# Patient Record
Sex: MALE | Race: WHITE | NOT HISPANIC OR LATINO | URBAN - METROPOLITAN AREA
[De-identification: names, ages, dates, MRNs, and addresses within clinical notes are randomized per-mention and may not be internally consistent; named-entity substitution may affect disease eponyms.]

---

## 2025-03-27 ENCOUNTER — INPATIENT (INPATIENT)
Facility: HOSPITAL | Age: 60
LOS: 0 days | Discharge: HOME HEALTH | DRG: 603 | End: 2025-03-28
Attending: SURGERY | Admitting: SURGERY
Payer: COMMERCIAL

## 2025-03-27 VITALS
HEIGHT: 73 IN | SYSTOLIC BLOOD PRESSURE: 105 MMHG | OXYGEN SATURATION: 100 % | DIASTOLIC BLOOD PRESSURE: 72 MMHG | RESPIRATION RATE: 17 BRPM | WEIGHT: 149.91 LBS | TEMPERATURE: 98 F | HEART RATE: 65 BPM

## 2025-03-27 DIAGNOSIS — L02.31 CUTANEOUS ABSCESS OF BUTTOCK: ICD-10-CM

## 2025-03-27 LAB
ALBUMIN SERPL ELPH-MCNC: 3.9 G/DL — SIGNIFICANT CHANGE UP (ref 3.5–5.2)
ALP SERPL-CCNC: 77 U/L — SIGNIFICANT CHANGE UP (ref 30–115)
ALT FLD-CCNC: 12 U/L — SIGNIFICANT CHANGE UP (ref 0–41)
ANION GAP SERPL CALC-SCNC: 12 MMOL/L — SIGNIFICANT CHANGE UP (ref 7–14)
AST SERPL-CCNC: 20 U/L — SIGNIFICANT CHANGE UP (ref 0–41)
BASOPHILS # BLD AUTO: 0.04 K/UL — SIGNIFICANT CHANGE UP (ref 0–0.2)
BASOPHILS NFR BLD AUTO: 0.3 % — SIGNIFICANT CHANGE UP (ref 0–1)
BILIRUB SERPL-MCNC: <0.2 MG/DL — SIGNIFICANT CHANGE UP (ref 0.2–1.2)
BUN SERPL-MCNC: 20 MG/DL — SIGNIFICANT CHANGE UP (ref 10–20)
CALCIUM SERPL-MCNC: 9.4 MG/DL — SIGNIFICANT CHANGE UP (ref 8.4–10.5)
CHLORIDE SERPL-SCNC: 107 MMOL/L — SIGNIFICANT CHANGE UP (ref 98–110)
CO2 SERPL-SCNC: 22 MMOL/L — SIGNIFICANT CHANGE UP (ref 17–32)
CREAT SERPL-MCNC: 0.7 MG/DL — SIGNIFICANT CHANGE UP (ref 0.7–1.5)
CRP SERPL-MCNC: 12.4 MG/L — HIGH
EGFR: 105 ML/MIN/1.73M2 — SIGNIFICANT CHANGE UP
EGFR: 105 ML/MIN/1.73M2 — SIGNIFICANT CHANGE UP
EOSINOPHIL # BLD AUTO: 0.04 K/UL — SIGNIFICANT CHANGE UP (ref 0–0.7)
EOSINOPHIL NFR BLD AUTO: 0.3 % — SIGNIFICANT CHANGE UP (ref 0–8)
ERYTHROCYTE [SEDIMENTATION RATE] IN BLOOD: 60 MM/HR — HIGH (ref 0–10)
GLUCOSE SERPL-MCNC: 112 MG/DL — HIGH (ref 70–99)
HCT VFR BLD CALC: 36.5 % — LOW (ref 42–52)
HGB BLD-MCNC: 12.3 G/DL — LOW (ref 14–18)
IMM GRANULOCYTES NFR BLD AUTO: 0.6 % — HIGH (ref 0.1–0.3)
LYMPHOCYTES # BLD AUTO: 1.25 K/UL — SIGNIFICANT CHANGE UP (ref 1.2–3.4)
LYMPHOCYTES # BLD AUTO: 7.9 % — LOW (ref 20.5–51.1)
MCHC RBC-ENTMCNC: 31.8 PG — HIGH (ref 27–31)
MCHC RBC-ENTMCNC: 33.7 G/DL — SIGNIFICANT CHANGE UP (ref 32–37)
MCV RBC AUTO: 94.3 FL — HIGH (ref 80–94)
MONOCYTES # BLD AUTO: 0.58 K/UL — SIGNIFICANT CHANGE UP (ref 0.1–0.6)
MONOCYTES NFR BLD AUTO: 3.7 % — SIGNIFICANT CHANGE UP (ref 1.7–9.3)
NEUTROPHILS # BLD AUTO: 13.82 K/UL — HIGH (ref 1.4–6.5)
NEUTROPHILS NFR BLD AUTO: 87.2 % — HIGH (ref 42.2–75.2)
NRBC BLD AUTO-RTO: 0 /100 WBCS — SIGNIFICANT CHANGE UP (ref 0–0)
PLATELET # BLD AUTO: 256 K/UL — SIGNIFICANT CHANGE UP (ref 130–400)
PMV BLD: 9.7 FL — SIGNIFICANT CHANGE UP (ref 7.4–10.4)
POTASSIUM SERPL-MCNC: 4.9 MMOL/L — SIGNIFICANT CHANGE UP (ref 3.5–5)
POTASSIUM SERPL-SCNC: 4.9 MMOL/L — SIGNIFICANT CHANGE UP (ref 3.5–5)
PROT SERPL-MCNC: 6.6 G/DL — SIGNIFICANT CHANGE UP (ref 6–8)
RBC # BLD: 3.87 M/UL — LOW (ref 4.7–6.1)
RBC # FLD: 12.8 % — SIGNIFICANT CHANGE UP (ref 11.5–14.5)
SODIUM SERPL-SCNC: 141 MMOL/L — SIGNIFICANT CHANGE UP (ref 135–146)
WBC # BLD: 15.83 K/UL — HIGH (ref 4.8–10.8)
WBC # FLD AUTO: 15.83 K/UL — HIGH (ref 4.8–10.8)

## 2025-03-27 RX ORDER — ENOXAPARIN SODIUM 100 MG/ML
40 INJECTION SUBCUTANEOUS EVERY 24 HOURS
Refills: 0 | Status: DISCONTINUED | OUTPATIENT
Start: 2025-03-27 | End: 2025-03-28

## 2025-03-27 RX ORDER — VANCOMYCIN HCL IN 5 % DEXTROSE 1.5G/250ML
1000 PLASTIC BAG, INJECTION (ML) INTRAVENOUS EVERY 12 HOURS
Refills: 0 | Status: DISCONTINUED | OUTPATIENT
Start: 2025-03-28 | End: 2025-03-28

## 2025-03-27 RX ORDER — ACETAMINOPHEN 500 MG/5ML
650 LIQUID (ML) ORAL ONCE
Refills: 0 | Status: COMPLETED | OUTPATIENT
Start: 2025-03-27 | End: 2025-03-27

## 2025-03-27 RX ORDER — PIPERACILLIN-TAZO-DEXTROSE,ISO 3.375G/5
3.38 IV SOLUTION, PIGGYBACK PREMIX FROZEN(ML) INTRAVENOUS ONCE
Refills: 0 | Status: COMPLETED | OUTPATIENT
Start: 2025-03-27 | End: 2025-03-27

## 2025-03-27 RX ORDER — PIPERACILLIN-TAZO-DEXTROSE,ISO 3.375G/5
3.38 IV SOLUTION, PIGGYBACK PREMIX FROZEN(ML) INTRAVENOUS ONCE
Refills: 0 | Status: COMPLETED | OUTPATIENT
Start: 2025-03-28 | End: 2025-03-28

## 2025-03-27 RX ORDER — ACETAMINOPHEN 500 MG/5ML
650 LIQUID (ML) ORAL EVERY 6 HOURS
Refills: 0 | Status: DISCONTINUED | OUTPATIENT
Start: 2025-03-27 | End: 2025-03-28

## 2025-03-27 RX ORDER — PIPERACILLIN-TAZO-DEXTROSE,ISO 3.375G/5
3.38 IV SOLUTION, PIGGYBACK PREMIX FROZEN(ML) INTRAVENOUS EVERY 8 HOURS
Refills: 0 | Status: DISCONTINUED | OUTPATIENT
Start: 2025-03-28 | End: 2025-03-28

## 2025-03-27 RX ORDER — DIPHENHYDRAMINE HCL 12.5MG/5ML
25 ELIXIR ORAL ONCE
Refills: 0 | Status: COMPLETED | OUTPATIENT
Start: 2025-03-27 | End: 2025-03-27

## 2025-03-27 RX ORDER — KETOROLAC TROMETHAMINE 30 MG/ML
15 INJECTION, SOLUTION INTRAMUSCULAR; INTRAVENOUS EVERY 6 HOURS
Refills: 0 | Status: DISCONTINUED | OUTPATIENT
Start: 2025-03-27 | End: 2025-03-28

## 2025-03-27 RX ORDER — VANCOMYCIN HCL IN 5 % DEXTROSE 1.5G/250ML
1000 PLASTIC BAG, INJECTION (ML) INTRAVENOUS ONCE
Refills: 0 | Status: COMPLETED | OUTPATIENT
Start: 2025-03-27 | End: 2025-03-27

## 2025-03-27 RX ADMIN — Medication 25 MILLIGRAM(S): at 18:04

## 2025-03-27 RX ADMIN — Medication 250 MILLIGRAM(S): at 19:38

## 2025-03-27 RX ADMIN — Medication 200 GRAM(S): at 21:41

## 2025-03-27 NOTE — H&P ADULT - ATTENDING COMMENTS
59 year old male with recurrent infections of soft tissues and multiple abscesses in his gluteal region and right knee.   PMHx prior narcotic abuse, tobacco and marijuana use, prior homelessness, PSHx R shoulder surgery who presented to the ED with 1 week pain and erythema in his L and R gluteal region and R knee.   O/E:  Afebrile, HDS, leukocytosis to 15.8, evaluated by orthopedic surgery who determined no concern for septic arthritis or further orthopedic intervention.    PLAN:  - Admit to Surgery for IV abx  - S/p bedside I&D of abscesses (see procedure note for details)  - Zosyn and vancomycin  - Regular diet  - Pain management  - ID consult for multiple abscesses in absence of triggering factors      Time spent is 1 hour  [id number], 72

## 2025-03-27 NOTE — H&P ADULT - NSICDXPASTMEDICALHX_GEN_ALL_CORE_FT
PAST MEDICAL HISTORY:  Marijuana use     Narcotic abuse     Tobacco dependence with current use

## 2025-03-27 NOTE — H&P ADULT - NSHPPHYSICALEXAM_GEN_ALL_CORE
GENERAL: A&Ox3, NAD  HEENT: Normocephalic, atraumatic  PULM: Non-labored respirations, bilateral chest rise, saturating well on RA  CV: Regular rate and rhythm  ABDOMEN: Abdomen soft, non-distended, non-tender  RECTAL: L and R gluteal abscesses with small punctum with purulence, fluctuance, erythema, induration, tender to palpation, no external anal lesions, L>R in size  MSK: Moving extremities spontaneously, BUE and BLE sensorimotor and strength intact, R knee abscess with 0.5-1 cm scab, punctum with purulence, surrounding erythema, induration, and fluctuance, tender to palpation  SKIN: Warm, dry, normal skin color, texture, turgor

## 2025-03-27 NOTE — ED PROVIDER NOTE - PHYSICAL EXAMINATION
CONSTITUTIONAL: well developed, nontoxic appearing, in no acute distress, speaking in full sentences  SKIN: warm, dry. 2cm x1.5cm fluctuant abscess left lateral to anus on inside of L. buttox. 2cm x 1.5cm abscess on anterior aspect of r. knee, actively draining purulent secretions.   HEENT: normocephalic, no conjunctival erythema, moist mucous membranes, patent airway  NECK: supple  CV:  regular rate  RESP: normal work of breathing  ABD: nondistended  MSK: moves all extremities, no cyanosis, no edema  NEURO: alert, oriented, grossly unremarkable  : Without lesions.   PSYCH: cooperative, appropriate

## 2025-03-27 NOTE — ED ADULT TRIAGE NOTE - CHIEF COMPLAINT QUOTE
"I have abscess in my right knee. It's been like this for like a week. 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

## 2025-03-27 NOTE — CONSULT NOTE ADULT - SUBJECTIVE AND OBJECTIVE BOX
· HPI Objective Statement: 59M with no PMHx presents to the ED for multiple abscesses. Patient refers that he has never had abscesses before, that recently he moved into a new apartment and sat naked on the dirty floor, noticed a few days later that he had some redness, swelling, and irritation just lateral to his anus. Patient also noticed he had some pain to his left knee three days ago after bending down at work. Today he noticed some purulent drainage from the knee and went to  who sent him to the ED for IV anitbiotics. Otherwise denies fevers, headache, chills, cp, sob, n/v/d, abd pain, back pain, changes in urinary/stool habitus, calf tenderness or swelling, recent travel.  pt seen at bedside.    states he felt a puncture injury to the knee area.   is able to ambulate and weight bear fully         Allergies    No Known Allergies    Intolerances      PAST MEDICAL & SURGICAL HISTORY:    MEDICATIONS  (STANDING):  acetaminophen     Tablet .. 650 milliGRAM(s) Oral once    MEDICATIONS  (PRN):      Vital Signs Last 24 Hrs  T(C): 36.9 (27 Mar 2025 14:42), Max: 36.9 (27 Mar 2025 14:42)  T(F): 98.4 (27 Mar 2025 14:42), Max: 98.4 (27 Mar 2025 14:42)  HR: 65 (27 Mar 2025 14:42) (65 - 65)  BP: 105/72 (27 Mar 2025 14:42) (105/72 - 105/72)  BP(mean): --  RR: 17 (27 Mar 2025 14:42) (17 - 17)  SpO2: 100% (27 Mar 2025 14:42) (100% - 100%)    Parameters below as of 27 Mar 2025 14:42  Patient On (Oxygen Delivery Method): room air         right lower ext:   sitting in chair comfortable with knee bent to 90       no swelling of the knee,    pt has area of erythema and fluctuance over vmo area,  pt can flex and extend knee 0-110 no difficulties,   observed ambulation with full weight bearing around the room.                 Imaging: pending, labs pending

## 2025-03-27 NOTE — CONSULT NOTE ADULT - ASSESSMENT
right  knee fluctuance likely abscess extraarticular to knee joint     no concerns of septic arthritis   can follow up ordered labs and knee xray for completion   no orthopedic intervention   please consult burn service for numerous abscess about body   recall prn

## 2025-03-27 NOTE — CONSULT NOTE ADULT - SUBJECTIVE AND OBJECTIVE BOX
GENERAL SURGERY CONSULT NOTE    Patient: RODO KONG , 59y ([birth year])Male   MRN: [id number]  Location: [facility] ED  Visit: 03-27-25 Emergency  Date: 03-27-25 @ 17:44    HPI:  Pt is 59M no reported PMHx or PSHx who presented to the ED with 1 week pain and erythema in his L gluteal region and R knee. Per pt, symptoms began in L gluteal region 2 days after sitting undressed in his apartment on dirty floor and R knee symptoms began approximately 2 days after kneeling at work and feeling like he sustained a small puncture injury to the knee. Spent the last few days trying to keep his wounds clean with betadine, presented to the ER for persistent pain and erythema of his wounds. Has been able to bear weight on the knee and ambulate without difficulty or change from baseline, reports intermittent purulent drainage from the knee, pain has not worsened. Denies fevers, chills, CP, SOB, N/V/C/D, rectal pain, dyschezia, melena, hematochezia, blood or purulence when wiping, bleeding from knee, or any other abscesses. Denies prior episodes of abscesses. Upon arrival to the ED, was afebrile, HDS, leukocytosis to 15.8, evaluated by orthopedic surgery who determined no concern for septic arthritis or further orthopedic intervention.    PAST MEDICAL & SURGICAL HISTORY:  Denies    Home Medications:  Denies    VITALS:  T(F): 98.4 (03-27-25 @ 14:42), Max: 98.4 (03-27-25 @ 14:42)  HR: 65 (03-27-25 @ 14:42) (65 - 65)  BP: 105/72 (03-27-25 @ 14:42) (105/72 - 105/72)  RR: 17 (03-27-25 @ 14:42) (17 - 17)  SpO2: 100% (03-27-25 @ 14:42) (100% - 100%)    PHYSICAL EXAM:  GENERAL: A&Ox3, NAD  HEENT: Normocephalic, atraumatic  PULM: Non-labored respirations, bilateral chest rise, saturating well on RA  CV: Regular rate and rhythm  ABDOMEN: Abdomen soft, non-distended, non-tender  RECTAL: L gluteal abscess with small punctum with purulence, fluctuance, erythema, induration, tender to palpation, no external anal lesions  MSK: Moving extremities spontaneously, BUE and BLE sensorimotor and strength intact, R knee abscess with 0.5-1 cm scab, punctum with purulence, surrounding erythema, induration, and fluctuance, tender to palpation  SKIN: Warm, dry, normal skin color, texture, turgor    LAB/STUDIES:                        12.3   15.83 )-----------( 256      ( 27 Mar 2025 16:39 )             36.5     03-27    141  |  107  |  20  ----------------------------<  112[H]  4.9   |  22  |  0.7    Ca    9.4      27 Mar 2025 16:39    TPro  6.6  /  Alb  3.9  /  TBili  <0.2  /  DBili  x   /  AST  20  /  ALT  12  /  AlkPhos  77  03-27      LIVER FUNCTIONS - ( 27 Mar 2025 16:39 )  Alb: 3.9 g/dL / Pro: 6.6 g/dL / ALK PHOS: 77 U/L / ALT: 12 U/L / AST: 20 U/L / GGT: x           Urinalysis Basic - ( 27 Mar 2025 16:39 )    Color: x / Appearance: x / SG: x / pH: x  Gluc: 112 mg/dL / Ketone: x  / Bili: x / Urobili: x   Blood: x / Protein: x / Nitrite: x   Leuk Esterase: x / RBC: x / WBC x   Sq Epi: x / Non Sq Epi: x / Bacteria: x    ASSESSMENT:  59M no reported PMHx or PSHx who presented to the ED with 1 week pain and erythema in his L gluteal region and R knee. Examination c/w L gluteal abscess and R knee abscess.    PLAN:  - Final plan pending discussion with attending    To be discussed with attending.   GENERAL SURGERY CONSULT NOTE    Patient: RODO KONG , 59y ([birth year])Male   MRN: [id number]  Location: [facility] ED  Visit: 03-27-25 Emergency  Date: 03-27-25 @ 17:44    HPI:  Pt is 59M PMHx prior substance use, tobacco use, PSHx R shoulder surgery who presented to the ED with 1 week pain and erythema in his L gluteal region and R knee. Per pt, symptoms began in L gluteal region 2 days after sitting undressed in his apartment on dirty floor and R knee symptoms began approximately 2 days after kneeling at work and feeling like he sustained a small puncture injury to the knee. Spent the last few days trying to keep his wounds clean with betadine, presented to the ER for persistent pain and erythema of his wounds. Has been able to bear weight on the knee and ambulate without difficulty or change from baseline, reports intermittent purulent drainage from the knee, pain has not worsened. Denies fevers, chills, CP, SOB, N/V/C/D, rectal pain, dyschezia, melena, hematochezia, blood or purulence when wiping, bleeding from knee, or any other abscesses. Denies prior episodes of abscesses. Upon arrival to the ED, was afebrile, HDS, leukocytosis to 15.8, evaluated by orthopedic surgery who determined no concern for septic arthritis or further orthopedic intervention.    PAST MEDICAL & SURGICAL HISTORY:  Prior substance use  R shoulder surgery    Home Medications:  Denies    VITALS:  T(F): 98.4 (03-27-25 @ 14:42), Max: 98.4 (03-27-25 @ 14:42)  HR: 65 (03-27-25 @ 14:42) (65 - 65)  BP: 105/72 (03-27-25 @ 14:42) (105/72 - 105/72)  RR: 17 (03-27-25 @ 14:42) (17 - 17)  SpO2: 100% (03-27-25 @ 14:42) (100% - 100%)    PHYSICAL EXAM:  GENERAL: A&Ox3, NAD  HEENT: Normocephalic, atraumatic  PULM: Non-labored respirations, bilateral chest rise, saturating well on RA  CV: Regular rate and rhythm  ABDOMEN: Abdomen soft, non-distended, non-tender  RECTAL: L gluteal abscess with small punctum with purulence, fluctuance, erythema, induration, tender to palpation, no external anal lesions  MSK: Moving extremities spontaneously, BUE and BLE sensorimotor and strength intact, R knee abscess with 0.5-1 cm scab, punctum with purulence, surrounding erythema, induration, and fluctuance, tender to palpation  SKIN: Warm, dry, normal skin color, texture, turgor    LAB/STUDIES:                        12.3   15.83 )-----------( 256      ( 27 Mar 2025 16:39 )             36.5     03-27    141  |  107  |  20  ----------------------------<  112[H]  4.9   |  22  |  0.7    Ca    9.4      27 Mar 2025 16:39    TPro  6.6  /  Alb  3.9  /  TBili  <0.2  /  DBili  x   /  AST  20  /  ALT  12  /  AlkPhos  77  03-27      LIVER FUNCTIONS - ( 27 Mar 2025 16:39 )  Alb: 3.9 g/dL / Pro: 6.6 g/dL / ALK PHOS: 77 U/L / ALT: 12 U/L / AST: 20 U/L / GGT: x           Urinalysis Basic - ( 27 Mar 2025 16:39 )    Color: x / Appearance: x / SG: x / pH: x  Gluc: 112 mg/dL / Ketone: x  / Bili: x / Urobili: x   Blood: x / Protein: x / Nitrite: x   Leuk Esterase: x / RBC: x / WBC x   Sq Epi: x / Non Sq Epi: x / Bacteria: x    ASSESSMENT:  59M no reported PMHx or PSHx who presented to the ED with 1 week pain and erythema in his L gluteal region and R knee. Examination c/w L gluteal abscess and R knee abscess.    PLAN:  - Final plan pending discussion with attending    To be discussed with attending.

## 2025-03-27 NOTE — H&P ADULT - ASSESSMENT
ASSESSMENT:  59M PMHx prior narcotic abuse, tobacco and marijuana use, prior homelessness, PSHx R shoulder surgery who presented to the ED with 1 week pain and erythema in his L and R gluteal region and R knee. Afebrile, HDS, leukocytosis to 15.8, evaluated by orthopedic surgery who determined no concern for septic arthritis or further orthopedic intervention.    PLAN:  - Admit to 4C under Dr. Lucas for IV abx  - S/p bedside I&D of abscesses (see procedure note for details)  - Zosyn and vancomycin  - Regular diet  - Pain management  - ID consult for multiple abscesses in absence of triggering factors    Discussed with attending.

## 2025-03-27 NOTE — H&P ADULT - HISTORY OF PRESENT ILLNESS
Pt is 59M PMHx prior narcotic abuse, tobacco and marijuana use, prior homelessness, PSHx R shoulder surgery who presented to the ED with 1 week pain and erythema in his L and R gluteal region and R knee. Per pt, symptoms began in L and R gluteal region 2 days after sitting undressed in his apartment on dirty floor and R knee symptoms began approximately 2 days after kneeling at work and feeling like he sustained a small puncture injury to the knee. Spent the last few days trying to keep his wounds clean with betadine, presented to the ER for persistent pain and erythema of his wounds. Has been able to bear weight on the knee and ambulate without difficulty or change from baseline, reports intermittent purulent drainage from the knee, pain has not worsened. Denies fevers, chills, CP, SOB, N/V/C/D, rectal pain, dyschezia, melena, hematochezia, blood or purulence when wiping, bleeding from knee, or any other abscesses. Denies prior episodes of abscesses. Upon arrival to the ED, was afebrile, HDS, leukocytosis to 15.8, evaluated by orthopedic surgery who determined no concern for septic arthritis or further orthopedic intervention. During evaluation, pt reported being prescribed various PO antibiotics for dental pain by Urgent Care in recent months, was also prescribed prednisone 2mg BID in 2/19 for unspecified reason, has been taking sporadically for pain, reports last took a dose this AM (last dose was weeks ago).

## 2025-03-27 NOTE — ED PROVIDER NOTE - PROGRESS NOTE DETAILS
DOMINIC WESLEY:  Patient evaluated as per HPI and PE.  Patient with multiple abscess. Called ortho for R. Knee abscess.   Plan: Labs, US, Ortho Recs, I&D Pilonidal, Revaluate DOMINIC WESLEY:  Ortho contacted given concern for septic joint. Refer is not septic joint.   Recommened to contact burn/surg.  Surgery contacted for abscess i&d. F/U Recommendations DOMINIC WESLEY:  Surgery performing I&D, recommended admission to Dr. Lucas 4C and initiation of IV Abx

## 2025-03-27 NOTE — PROCEDURE NOTE - ADDITIONAL PROCEDURE DETAILS
I&D of L gluteal abscess, R gluteal abscess, R knee abscess. 1% lidocaine injected for analgesia. #11 blade used to make 1.5 cm incision on R gluteal abscess, 3.5 cm incision on L gluteal abscess, 3 cm incision on R knee abscess. R gluteal abscess yielded bloody fluid, while L gluteal abscess and R knee abscess yielded copious purulence. Incisions were irrigated with betadine and saline, packed with 1/4-inch packing, dressed with gauze and foam tape. Pt tolerated without complications.

## 2025-03-27 NOTE — ED PROVIDER NOTE - OBJECTIVE STATEMENT
59M with no PMHx presents to the ED for multiple abscesses. Patient refers that he has never had abscesses before, that recently he moved into a new apartment and sat naked on the dirty floor, noticed a few days later that he had some redness, swelling, and irritation just lateral to his anus. Patient also noticed he had some pain to his left knee three days ago after bending down at work. Today he noticed some purulent drainage from the knee and went to  who sent him to the ED for IV anitbiotics. Otherwise denies fevers, headache, chills, cp, sob, n/v/d, abd pain, back pain, changes in urinary/stool habitus, calf tenderness or swelling, recent travel.

## 2025-03-27 NOTE — ED PROVIDER NOTE - CLINICAL SUMMARY MEDICAL DECISION MAKING FREE TEXT BOX
59M PMHx prior narcotic abuse, tobacco and marijuana use, prior homelessness, PSHx R shoulder surgery who presented to the ED with 1 week pain and erythema in his L and R gluteal region and R knee. Pt underwent bedside I&D by surgery, admitted for further eval. Additionally, pt seen by ortho, no concern for knee joint involvement.

## 2025-03-27 NOTE — H&P ADULT - NSHPLABSRESULTS_GEN_ALL_CORE
12.3   15.83 )-----------( 256      ( 27 Mar 2025 16:39 )             36.5     03-27    141  |  107  |  20  ----------------------------<  112[H]  4.9   |  22  |  0.7    Ca    9.4      27 Mar 2025 16:39    TPro  6.6  /  Alb  3.9  /  TBili  <0.2  /  DBili  x   /  AST  20  /  ALT  12  /  AlkPhos  77  03-27      LIVER FUNCTIONS - ( 27 Mar 2025 16:39 )  Alb: 3.9 g/dL / Pro: 6.6 g/dL / ALK PHOS: 77 U/L / ALT: 12 U/L / AST: 20 U/L / GGT: x           Urinalysis Basic - ( 27 Mar 2025 16:39 )    Color: x / Appearance: x / SG: x / pH: x  Gluc: 112 mg/dL / Ketone: x  / Bili: x / Urobili: x   Blood: x / Protein: x / Nitrite: x   Leuk Esterase: x / RBC: x / WBC x   Sq Epi: x / Non Sq Epi: x / Bacteria: x

## 2025-03-28 ENCOUNTER — TRANSCRIPTION ENCOUNTER (OUTPATIENT)
Age: 60
End: 2025-03-28

## 2025-03-28 VITALS
HEART RATE: 60 BPM | RESPIRATION RATE: 18 BRPM | SYSTOLIC BLOOD PRESSURE: 100 MMHG | TEMPERATURE: 98 F | DIASTOLIC BLOOD PRESSURE: 70 MMHG | OXYGEN SATURATION: 98 %

## 2025-03-28 RX ORDER — AMOXICILLIN AND CLAVULANATE POTASSIUM 500; 125 MG/1; MG/1
875 TABLET, FILM COATED ORAL
Qty: 20 | Refills: 0
Start: 2025-03-28 | End: 2025-04-06

## 2025-03-28 RX ORDER — ACETAMINOPHEN 500 MG/5ML
2 LIQUID (ML) ORAL
Qty: 0 | Refills: 0 | DISCHARGE
Start: 2025-03-28

## 2025-03-28 RX ORDER — DOXYCYCLINE HYCLATE 100 MG
1 TABLET ORAL
Qty: 20 | Refills: 0
Start: 2025-03-28 | End: 2025-04-06

## 2025-03-28 RX ORDER — CEFTRIAXONE 500 MG/1
2 INJECTION, POWDER, FOR SOLUTION INTRAMUSCULAR; INTRAVENOUS EVERY 24 HOURS
Refills: 0 | Status: DISCONTINUED | OUTPATIENT
Start: 2025-03-28 | End: 2025-03-28

## 2025-03-28 RX ORDER — SODIUM CHLORIDE 9 G/1000ML
500 INJECTION, SOLUTION INTRAVENOUS ONCE
Refills: 0 | Status: COMPLETED | OUTPATIENT
Start: 2025-03-28 | End: 2025-03-28

## 2025-03-28 RX ORDER — OXYCODONE HYDROCHLORIDE 30 MG/1
1 TABLET ORAL
Qty: 12 | Refills: 0
Start: 2025-03-28 | End: 2025-03-30

## 2025-03-28 RX ORDER — CEFTRIAXONE 500 MG/1
2000 INJECTION, POWDER, FOR SOLUTION INTRAMUSCULAR; INTRAVENOUS EVERY 24 HOURS
Refills: 0 | Status: DISCONTINUED | OUTPATIENT
Start: 2025-03-28 | End: 2025-03-28

## 2025-03-28 RX ADMIN — Medication 25 GRAM(S): at 08:05

## 2025-03-28 RX ADMIN — KETOROLAC TROMETHAMINE 15 MILLIGRAM(S): 30 INJECTION, SOLUTION INTRAMUSCULAR; INTRAVENOUS at 11:50

## 2025-03-28 RX ADMIN — Medication 650 MILLIGRAM(S): at 11:20

## 2025-03-28 RX ADMIN — KETOROLAC TROMETHAMINE 15 MILLIGRAM(S): 30 INJECTION, SOLUTION INTRAMUSCULAR; INTRAVENOUS at 12:05

## 2025-03-28 RX ADMIN — Medication 650 MILLIGRAM(S): at 06:57

## 2025-03-28 RX ADMIN — Medication 650 MILLIGRAM(S): at 11:19

## 2025-03-28 RX ADMIN — Medication 250 MILLIGRAM(S): at 06:26

## 2025-03-28 RX ADMIN — KETOROLAC TROMETHAMINE 15 MILLIGRAM(S): 30 INJECTION, SOLUTION INTRAMUSCULAR; INTRAVENOUS at 06:27

## 2025-03-28 RX ADMIN — Medication 650 MILLIGRAM(S): at 00:26

## 2025-03-28 RX ADMIN — SODIUM CHLORIDE 500 MILLILITER(S): 9 INJECTION, SOLUTION INTRAVENOUS at 04:20

## 2025-03-28 RX ADMIN — Medication 650 MILLIGRAM(S): at 06:27

## 2025-03-28 RX ADMIN — Medication 650 MILLIGRAM(S): at 00:56

## 2025-03-28 RX ADMIN — KETOROLAC TROMETHAMINE 15 MILLIGRAM(S): 30 INJECTION, SOLUTION INTRAMUSCULAR; INTRAVENOUS at 00:56

## 2025-03-28 RX ADMIN — KETOROLAC TROMETHAMINE 15 MILLIGRAM(S): 30 INJECTION, SOLUTION INTRAMUSCULAR; INTRAVENOUS at 00:26

## 2025-03-28 RX ADMIN — CEFTRIAXONE 100 MILLIGRAM(S): 500 INJECTION, POWDER, FOR SOLUTION INTRAMUSCULAR; INTRAVENOUS at 14:02

## 2025-03-28 RX ADMIN — Medication 25 GRAM(S): at 00:25

## 2025-03-28 RX ADMIN — KETOROLAC TROMETHAMINE 15 MILLIGRAM(S): 30 INJECTION, SOLUTION INTRAMUSCULAR; INTRAVENOUS at 06:57

## 2025-03-28 RX ADMIN — Medication 1 APPLICATION(S): at 14:05

## 2025-03-28 NOTE — CONSULT NOTE ADULT - ASSESSMENT
#Sinus Bradycardia, suspect vagal response in setting of severe pain  #Bilateral Gluteal abscess and R knee abscess s/p I&D  #Hx of Polysubstance Use    Recommendations:  - Continue to monitor HR, would recommend telemetry monitoring  - Monitor for any symptoms of dizziness/lightheadedness, chest pain, dyspnea  - Avoid AV susan blockers  - Pain control as per primary team #Sinus Bradycardia, vagal tone vs anesthesia, no evidence of high degree av block , asymptomatic , no syncope   #Bilateral Gluteal abscess and R knee abscess s/p I&D  #Hx of Polysubstance Use    Recommendations:  -  monitor HR, while in hospital   - Monitor for any symptoms of dizziness/lightheadedness, chest pain, dyspnea  - Avoid AV susan blockers  - Pain control as per primary team

## 2025-03-28 NOTE — DISCHARGE NOTE PROVIDER - CARE PROVIDER_API CALL
Wilfrido Lucas  Surgery  [address] [place] [id number]  Phone: [phone]  Fax: [phone]  Follow Up Time: 1 week

## 2025-03-28 NOTE — DISCHARGE NOTE NURSING/CASE MANAGEMENT/SOCIAL WORK - NSDCPEFALRISK_GEN_ALL_CORE
For information on Fall & Injury Prevention, visit: [email or web address] OR  [email or web address] OR  https://www.cdc.gov/steadi/patient.html

## 2025-03-28 NOTE — DISCHARGE NOTE PROVIDER - NSDCCPCAREPLAN_GEN_ALL_CORE_FT
PRINCIPAL DISCHARGE DIAGNOSIS  Diagnosis: Abscess of buttock  Assessment and Plan of Treatment: Remove packing in 48hrs. Wound does not need to be repacked.  Can use gauze dressing in place afterwards to catch any discharge from the wound.  Please perform Sitz baths 3x a day.  Wash area with soap and water after bowel movements.   Avoid constipating agents, would recommend using stool softeners if you normally have hard stools.  Follow up with Dr. Lucas in 1-2 weeks.   Continue augmentin BID and doxy 100 BID for 10 days. Please take on a full stomach.   Take tylenol and/or ibuprofen for pain.        SECONDARY DISCHARGE DIAGNOSES  Diagnosis: Abscess of knee  Assessment and Plan of Treatment: Remove packing in 48hrs, keep wound covered with gauze and secure with tape. Can change dressing if soiled.   Continue antibiotics as above.  Follow up in 1 week with Dr. Lucas, call to schedule the appointment.     PRINCIPAL DISCHARGE DIAGNOSIS  Diagnosis: Abscess of buttock  Assessment and Plan of Treatment: Remove packing in 24-48hrs. Wound does not need to be repacked.  Can use gauze dressing in place afterwards to catch any discharge from the wound.  Please perform Sitz baths 3x a day.  Wash area with soap and water after bowel movements.   Avoid constipating agents, would recommend using stool softeners if you normally have hard stools.  Follow up with Dr. Lucas in 1-2 weeks.   Continue augmentin BID and doxy 100 BID for 10 days. Please take on a full stomach.   Take tylenol and/or ibuprofen for pain. Take oxycodone 5mg only as needed for breakthrough severe pain.        SECONDARY DISCHARGE DIAGNOSES  Diagnosis: Abscess of knee  Assessment and Plan of Treatment: Remove packing in 24-48hrs, keep wound covered with gauze and secure with tape. Can change dressing if soiled.   Continue antibiotics as above.  Follow up in 1 week with Dr. Lucas, call to schedule the appointment.     PRINCIPAL DISCHARGE DIAGNOSIS  Diagnosis: Abscess of buttock  Assessment and Plan of Treatment: Remove packing in 24-48hrs. Wound does not need to be repacked.  Can use gauze dressing in place afterwards to catch any discharge from the wound.  Please perform Sitz baths 3x a day.  Wash area with soap and water after bowel movements.   Avoid constipating agents, would recommend using stool softeners if you normally have hard stools.  Follow up with Dr. Lucas in 1-2 weeks.   Continue augmentin BID and doxy 100 BID for 10 days. Please take on a full stomach.   Take tylenol and/or ibuprofen for pain. Take oxycodone 5mg only as needed for breakthrough severe pain.        SECONDARY DISCHARGE DIAGNOSES  Diagnosis: Abscess of knee  Assessment and Plan of Treatment: Remove packing in 24-48hrs, keep wound covered with gauze and secure with tape. Can change dressing if soiled.   Continue antibiotics as above.  Follow up in 1 week with Dr. Lucas, call to schedule the appointment.  Can return to work on 4/2/25, Wednesday.

## 2025-03-28 NOTE — CONSULT NOTE ADULT - SUBJECTIVE AND OBJECTIVE BOX
LUANN RODO  59y, Male  Allergy: No Known Allergies      CHIEF COMPLAINT: Multiple abscesses (28 Mar 2025 01:25)      LOS  1d    HPI:  Pt is 59M PMHx prior narcotic abuse, tobacco and marijuana use, prior homelessness, PSHx R shoulder surgery who presented to the ED with 1 week pain and erythema in his L and R gluteal region and R knee. Per pt, symptoms began in L and R gluteal region 2 days after sitting undressed in his apartment on dirty floor and R knee symptoms began approximately 2 days after kneeling at work and feeling like he sustained a small puncture injury to the knee. Spent the last few days trying to keep his wounds clean with betadine, presented to the ER for persistent pain and erythema of his wounds. Has been able to bear weight on the knee and ambulate without difficulty or change from baseline, reports intermittent purulent drainage from the knee, pain has not worsened. Denies fevers, chills, CP, SOB, N/V/C/D, rectal pain, dyschezia, melena, hematochezia, blood or purulence when wiping, bleeding from knee, or any other abscesses. Denies prior episodes of abscesses. Upon arrival to the ED, was afebrile, HDS, leukocytosis to 15.8, evaluated by orthopedic surgery who determined no concern for septic arthritis or further orthopedic intervention. During evaluation, pt reported being prescribed various PO antibiotics for dental pain by Urgent Care in recent months, was also prescribed prednisone 2mg BID in 2/19 for unspecified reason, has been taking sporadically for pain, reports last took a dose this AM (last dose was weeks ago). (27 Mar 2025 19:50)      INFECTIOUS DISEASE HISTORY:  History as above.  Denies any recent injections/hospitalizations.   Denies IV drug use.   Underwent I and D of lesions yesterday.     PAST MEDICAL & SURGICAL HISTORY:  Narcotic abuse      Tobacco dependence with current use      Marijuana use          FAMILY HISTORY  Family history reviewed and non-contributory      SOCIAL HISTORY  Social History:  Formerly homeless, now in condominium (27 Mar 2025 19:50)        ROS  General: Denies rigors, nightsweats  HEENT: Denies headache, rhinorrhea, sore throat, eye pain  CV: Denies CP, palpitations  PULM: Denies wheezing, hemoptysis  GI: Denies hematemesis, hematochezia, melena  : Denies discharge, hematuria  MSK: Denies arthralgias, myalgias  SKIN: Denies rash, lesions  NEURO: Denies paresthesias, weakness  PSYCH: Denies depression, anxiety    VITALS:  T(F): 97.7, Max: 98.4 (03-27-25 @ 14:42)  HR: 45  BP: 98/57  RR: 18Vital Signs Last 24 Hrs  T(C): 36.5 (28 Mar 2025 00:29), Max: 36.9 (27 Mar 2025 14:42)  T(F): 97.7 (28 Mar 2025 00:29), Max: 98.4 (27 Mar 2025 14:42)  HR: 45 (28 Mar 2025 05:44) (45 - 65)  BP: 98/57 (28 Mar 2025 05:44) (95/51 - 110/52)  BP(mean): 72 (27 Mar 2025 19:59) (72 - 72)  RR: 18 (28 Mar 2025 00:29) (17 - 18)  SpO2: 97% (28 Mar 2025 00:29) (97% - 100%)    Parameters below as of 27 Mar 2025 19:59  Patient On (Oxygen Delivery Method): room air        PHYSICAL EXAM:  Gen: NAD, resting in bed  HEENT: Normocephalic, atraumatic  Neck: supple, no lymphadenopathy  CV: Regular rate & regular rhythm  Lungs: decreased BS at bases, no fremitus  Abdomen: Soft, BS present  Ext: Warm, well perfused  Neuro: non focal, awake  Skin: no rash, no erythema; right knee wound packed -- left and right buttock sites dressed - unable to visually examined wound due to patient preference -- no obvious fluctuance/induartion palpated   Lines: no phlebitis    TESTS & MEASUREMENTS:                        12.3  15.83 )-----------( 256      ( 27 Mar 2025 16:39 )             36.5    03-27    141  |  107  |  20  ----------------------------<  112[H]  4.9   |  22  |  0.7    Ca    9.4      27 Mar 2025 16:39    TPro  6.6  /  Alb  3.9  /  TBili  <0.2  /  DBili  x   /  AST  20  /  ALT  12  /  AlkPhos  77  03-27      LIVER FUNCTIONS - ( 27 Mar 2025 16:39 )  Alb: 3.9 g/dL / Pro: 6.6 g/dL / ALK PHOS: 77 U/L / ALT: 12 U/L / AST: 20 U/L / GGT: x          Urinalysis Basic - ( 27 Mar 2025 16:39 )    Color: x / Appearance: x / SG: x / pH: x  Gluc: 112 mg/dL / Ketone: x  / Bili: x / Urobili: x  Blood: x / Protein: x / Nitrite: x  Leuk Esterase: x / RBC: x / WBC x  Sq Epi: x / Non Sq Epi: x / Bacteria: x              INFECTIOUS DISEASES TESTING      RADIOLOGY & ADDITIONAL TESTS:  I have personally reviewed the last Chest xray  CXR      CT      CARDIOLOGY TESTING      MEDICATIONS  acetaminophen     Tablet .. 650 Oral every 6 hours  enoxaparin Injectable 40 SubCutaneous every 24 hours  piperacillin/tazobactam IVPB.. 3.375 IV Intermittent every 8 hours  vancomycin  IVPB 1000 IV Intermittent every 12 hours      Weight  Weight (kg): 68 (03-27-25 @ 14:42)    ANTIBIOTICS:  piperacillin/tazobactam IVPB.. 3.375 Gram(s) IV Intermittent every 8 hours  vancomycin  IVPB 1000 milliGRAM(s) IV Intermittent every 12 hours      ALLERGIES:  No Known Allergies

## 2025-03-28 NOTE — CONSULT NOTE ADULT - ASSESSMENT
ASSESSMENT  59M PMHx prior narcotic abuse, tobacco and marijuana use, prior homelessness, PSHx R shoulder surgery who presented to the ED with 1 week pain and erythema in his L and R gluteal region and R knee.    IMPRESSION  #Left/Right Gluteal Abscess and Right knee abscess  - s/p I and D of left gluteal/right gluteal and right knee abscess -- left gluteal and right knee with copious purulence  - seen by ortho 3/27 -- agree low suspicion for septic joint     #Bradycardia  #Narcotic use disorder - denies IV drug use     #Abx allergy: No Known Allergies    RECOMMENDATIONS  - continue vancomycin 1g q 12 hours   -- monitor creatinine   -- check vancomycin with AM labs  - narrow zosyn to ceftriaxone 2g daily   - follow-up blood cx to ensure no bacteremia   - follow-up I and D Cx  - trend WBC     Please call or message on Microsoft Teams if with any questions.  Spectra [phone]

## 2025-03-28 NOTE — DISCHARGE NOTE NURSING/CASE MANAGEMENT/SOCIAL WORK - FINANCIAL ASSISTANCE
[facility] provides services at a reduced cost to those who are determined to be eligible through [facility]’s financial assistance program. Information regarding [facility]’s financial assistance program can be found by going to [email or web address] or by calling [phone].

## 2025-03-28 NOTE — DISCHARGE NOTE PROVIDER - NSDCMRMEDTOKEN_GEN_ALL_CORE_FT
acetaminophen 325 mg oral tablet: 2 tab(s) orally every 6 hours  amoxicillin-clavulanate 875 mg-125 mg oral tablet: 875 milligram(s) orally every 12 hours  doxycycline hyclate 100 mg oral tablet: 1 tab(s) orally every 12 hours   acetaminophen 325 mg oral tablet: 2 tab(s) orally every 6 hours  amoxicillin-clavulanate 875 mg-125 mg oral tablet: 875 milligram(s) orally every 12 hours  doxycycline hyclate 100 mg oral tablet: 1 tab(s) orally every 12 hours  oxyCODONE 5 mg oral tablet: 1 tab(s) orally every 6 hours as needed for  severe pain MDD: 4

## 2025-03-28 NOTE — PROVIDER CONTACT NOTE (OTHER) - SITUATION
pt is ordered for Lovenox patient refused despite teaching and education. pt's morning BP was 93/59 HR 62, pt asymptomatic. Rn repeated BP it was 93/59 HR 68. pt remains asymptomatic
pt's evening BP is low at 92/56 HR 42, it appears pt runs low, pt is asymptomatic
I want to confirm is pt is leaving with HCS or packing his own wounds. also when should pt start ABX pt received, Rocephin, zosyn and vanco today
Patient arrived to unit from ED. Vitals taken.
Patient has been having soft bp readings and low heart rate. 500 ml LR bolus was given. No change to vitals.

## 2025-03-28 NOTE — DISCHARGE NOTE PROVIDER - HOSPITAL COURSE
Patient is a 59 year old male with a PMHx of prior narcotic abuse, tobacco and marijuana use, prior homelessness, PSHx R shoulder surgery who presented with a right knee abscess and bilateral perirectal abscesses, all 3 wounds drained at bedside in the Ed yesterday and packed. Orthopedics consulted for abscess near right knee, no concerns of septic arthritis, no orthopedic intervention. Patient admitted. Cardiology consulted for bradycardia who said no evidence of high degree av block , asymptomatic and no need for further aggressive workup at this point, f/u outpatient. INCOMPLETE     Patient is a 59 year old male with a PMHx of prior narcotic abuse, tobacco and marijuana use, prior homelessness, PSHx R shoulder surgery who presented with a right knee abscess and bilateral perirectal abscesses, all 3 wounds drained at bedside in the Ed yesterday and packed. Orthopedics consulted for abscess near right knee, no concerns of septic arthritis, no orthopedic intervention. Patient admitted. Cardiology consulted for bradycardia who said no evidence of high degree av block , asymptomatic and no need for further aggressive workup at this point, f/u outpatient. The patient will be discharged on 10 days of augmentin and doxycycline and will follow up outpatient in 1 week. The patient will remove the packing 48hrs.      Patient is a 59 year old male with a PMHx of prior narcotic abuse, tobacco and marijuana use, prior homelessness, PSHx R shoulder surgery who presented with a right knee abscess and bilateral perirectal abscesses, all 3 wounds drained at bedside in the Ed yesterday and packed. Orthopedics consulted for abscess near right knee, no concerns of septic arthritis, no orthopedic intervention. Patient admitted. Cardiology consulted for bradycardia who said no evidence of high degree av block , asymptomatic and no need for further aggressive workup at this point, f/u outpatient. The patient will be discharged on 10 days of augmentin and doxycycline and will follow up outpatient in 1 week. The patient will remove the packing 24-48hrs.

## 2025-03-28 NOTE — PROGRESS NOTE ADULT - NS ATTEND AMEND GEN_ALL_CORE FT
Patient complains of pain in the incisions.  WBC 15.  Afebrile.    PE:  AAO x3  Chest: clear.  CV : RRR  Abdomen: soft  Local: Left and Right Buttock Abscesses, S/P I&D           Right Knee Abscess, S/P I&D.    ASSESSMENT:  58 y/o male with Right Buttock Abscesses, S/P I&D.  Right Knee Abscess, S/P I&D.  Acute postoperative pain.  History of Drug Abuse.    PLAN:  - pain control - use Tylenol iv  - encourage incentive spirometer  - needs dressing change daily with plain packing  - on Vanco and Zosyn  - ID eval needed  - DVT prophylaxis

## 2025-03-28 NOTE — CONSULT NOTE ADULT - ATTENDING COMMENTS
patient seen and examined at bedside  side bradycardia, no evidence of high degree block no syncope of related symptoms, likely reactive.   no need for further aggressive workup at this point   plan as outlined above  can follow up with cardio as outpatient.

## 2025-03-28 NOTE — PROVIDER CONTACT NOTE (OTHER) - REASON
BP
Patient still hypotensive and jeovany post bolus
pt concerns
Pt is Bradycardic and hypotensive
discharge

## 2025-03-28 NOTE — CONSULT NOTE ADULT - SUBJECTIVE AND OBJECTIVE BOX
HPI:  Pt is 59M PMHx prior narcotic abuse, tobacco and marijuana use, prior homelessness, PSHx R shoulder surgery who presented to the ED with 1 week pain and erythema in his L and R gluteal region and R knee. Per pt, symptoms began in L and R gluteal region 2 days after sitting undressed in his apartment on dirty floor and R knee symptoms began approximately 2 days after kneeling at work and feeling like he sustained a small puncture injury to the knee. Spent the last few days trying to keep his wounds clean with betadine, presented to the ER for persistent pain and erythema of his wounds. Has been able to bear weight on the knee and ambulate without difficulty or change from baseline, reports intermittent purulent drainage from the knee, pain has not worsened. Denies fevers, chills, CP, SOB, N/V/C/D, rectal pain, dyschezia, melena, hematochezia, blood or purulence when wiping, bleeding from knee, or any other abscesses. Denies prior episodes of abscesses. Upon arrival to the ED, was afebrile, HDS, leukocytosis to 15.8, evaluated by orthopedic surgery who determined no concern for septic arthritis or further orthopedic intervention. During evaluation, pt reported being prescribed various PO antibiotics for dental pain by Urgent Care in recent months, was also prescribed prednisone 2mg BID in 2/19 for unspecified reason, has been taking sporadically for pain, reports last took a dose this AM (last dose was weeks ago). (27 Mar 2025 19:50)    Cardiology HPI:  Cardiology consulted for bradycardia. Pt with sinus bradycardia on EKG.  Denies any hx of slow HR, denies any cardiac hx. Denies any lightheadedness, dizziness, chest pain, dyspnea.    PAST MEDICAL & SURGICAL HISTORY  Narcotic abuse    Tobacco dependence with current use    Marijuana use      SOCIAL HISTORY:  Social History:  Formerly homeless, now in condominium (27 Mar 2025 19:50)      ALLERGIES:  No Known Allergies      MEDICATIONS:  acetaminophen     Tablet .. 650 milliGRAM(s) Oral every 6 hours  enoxaparin Injectable 40 milliGRAM(s) SubCutaneous every 24 hours  piperacillin/tazobactam IVPB.. 3.375 Gram(s) IV Intermittent every 8 hours  vancomycin  IVPB 1000 milliGRAM(s) IV Intermittent every 12 hours    PRN:  ketorolac   Injectable 15 milliGRAM(s) IV Push every 6 hours PRN      HOME MEDICATIONS:  Home Medications:      VITALS:   T(F): 97.7 (03-28 @ 00:29), Max: 98.4 (03-27 @ 14:42)  HR: 55 (03-28 @ 00:29) (45 - 65)  BP: 95/51 (03-28 @ 00:29) (95/51 - 110/52)  BP(mean): 72 (03-27 @ 19:59) (72 - 72)  RR: 18 (03-28 @ 00:29) (17 - 18)  SpO2: 97% (03-28 @ 00:29) (97% - 100%)    I&O's Summary      REVIEW OF SYSTEMS:  CONSTITUTIONAL: No weakness  HEENT: No visual changes, neck/ear pain  RESPIRATORY: No cough, sob  CARDIOVASCULAR: See HPI  GASTROINTESTINAL: No abdominal pain. No nausea, vomiting, diarrhea   GENITOURINARY: No dysuria, frequency or hematuria  NEUROLOGICAL: No new focal deficits    PHYSICAL EXAM:  General: Not in distress  Cardio: regular, slow rate, S1, S2, no murmur  Pulm: Clear air entry bilaterally, no wheezing, rales, rhonchi  Abdomen: Soft, non-tender, non-distended  Extremities: No edema in bilateral LE  Neuro: A&O x3    LABS:                        12.3   15.83 )-----------( 256      ( 27 Mar 2025 16:39 )             36.5     03-27    141  |  107  |  20  ----------------------------<  112[H]  4.9   |  22  |  0.7    Ca    9.4      27 Mar 2025 16:39    TPro  6.6  /  Alb  3.9  /  TBili  <0.2  /  DBili  x   /  AST  20  /  ALT  12  /  AlkPhos  77  03-27      Sedimentation Rate, Erythrocyte: 60 mm/Hr *H* (03-27-25 @ 16:39)        ECG:  Sinus bradycardia, 48 bpm

## 2025-03-28 NOTE — PROVIDER CONTACT NOTE (OTHER) - ASSESSMENT
BP 98/57 HR 45. Patient is asymptomatic. Denies any chest pain, dizziness, lightheadedness, or sob.
HR 48 (manual) /52. Patient is asymptomatic.

## 2025-03-28 NOTE — PROGRESS NOTE ADULT - ASSESSMENT
ASSESSMENT:  59M PMHx prior narcotic abuse, tobacco and marijuana use, prior homelessness, PSHx R shoulder surgery who presented with a right knee abscess and bilateral perirectal abscesses, all 3 wounds drained at bedside.     PLAN:  -2 rectal wounds will be packed with 1/4 plain packing, cover with gauze secure with tape once daily  -Right knee wound : pack with 1/4inch plain packing, cover with gauze, secure with tape once daily.  -Continue IV antibiotics while inpatient  -ID consult for work up for possible underlying immunologic disease.  -Cardiology evaluated for bradycardia- no work up necessary.   -Continue regular diet, Vitals and strict I's and O's.    Lines/Tubes: PIV,    [id number]

## 2025-03-28 NOTE — DISCHARGE NOTE NURSING/CASE MANAGEMENT/SOCIAL WORK - PATIENT PORTAL LINK FT
You can access the FollowMyHealth Patient Portal offered by [facility] by registering at the following website: [email or web address]. By joining [facility]’s FollowMyHealth portal, you will also be able to view your health information using other applications (apps) compatible with our system.

## 2025-03-28 NOTE — PROVIDER CONTACT NOTE (OTHER) - ACTION/TREATMENT ORDERED:
provider spoke with pt at length start abx tomorrow and pt agreeable to do wound care himself, no HCS
Alma Villeda notifed of vitals. Recheck in 30 minutes.
Alma Villeda notified. NO further interventions.
Provider aware, no further intervention by team at this time
Provider aware no further intervention at this time

## 2025-03-28 NOTE — DISCHARGE NOTE PROVIDER - CARE PROVIDERS DIRECT ADDRESSES
[email or web address]

## 2025-03-28 NOTE — PROGRESS NOTE ADULT - SUBJECTIVE AND OBJECTIVE BOX
GENERAL SURGERY PROGRESS NOTE    Patient: RODO KONG , 59y ([birth year])Male   MRN: [id number]  Location: [place] [address] A  Visit: 03-27-25 Inpatient  Date: 03-28-25 @ 10:41    Hospital Day #: 2  Post-Op Day #:    Procedure/Dx/Injuries: Multiple abscesses    Events of past 24 hours: The patient had 3 abscesses drained at bedside, patient was aggressive with the staff this morning, making threats towards the primary team when asked to examine the wounds. The patient was bradycardic overnight, cardiology was consulted, recommended no further work up. ID consulted today to evaluate for potential underlying immunological diseases that could lead to him developing multiple abscesses at the same time.    PAST MEDICAL & SURGICAL HISTORY:  Narcotic abuse  Tobacco dependence with current use  Marijuana use    Vitals:   T(F): 97.9 (03-28-25 @ 08:26), Max: 98.4 (03-27-25 @ 14:42)  HR: 68 (03-28-25 @ 09:08)  BP: 93/59 (03-28-25 @ 09:08)  RR: 18 (03-28-25 @ 08:26)  SpO2: 100% (03-28-25 @ 08:26)      Diet, Regular    Bowel Movement: : [] YES [x] NO  Flatus: : [] YES [x] NO    PHYSICAL EXAM:  GENERAL: NAD, well-appearing  CHEST/LUNG: Equal chest rise bilaterally  HEART: Regular rate and rhythm  ABDOMEN: Soft, Nontender, Nondistended;   EXTREMITIES:  No clubbing, cyanosis, or edema. Right knee dressing intact, patient refused examination, tape was pulled up but patient requested the team leave him alone. The rectal abscesses were not examined as the patient adamantly refused.      MEDICATIONS  (STANDING):  acetaminophen     Tablet .. 650 milliGRAM(s) Oral every 6 hours  enoxaparin Injectable 40 milliGRAM(s) SubCutaneous every 24 hours  piperacillin/tazobactam IVPB.. 3.375 Gram(s) IV Intermittent every 8 hours  vancomycin  IVPB 1000 milliGRAM(s) IV Intermittent every 12 hours    MEDICATIONS  (PRN):  ketorolac   Injectable 15 milliGRAM(s) IV Push every 6 hours PRN Moderate Pain (4 - 6)      DVT PROPHYLAXIS: enoxaparin Injectable 40 milliGRAM(s) SubCutaneous every 24 hours    GI PROPHYLAXIS:   ANTICOAGULATION:   ANTIBIOTICS:  piperacillin/tazobactam IVPB.. 3.375 Gram(s)  vancomycin  IVPB 1000 milliGRAM(s)            LAB/STUDIES:  Labs:  CAPILLARY BLOOD GLUCOSE                              12.3   15.83 )-----------( 256      ( 27 Mar 2025 16:39 )             36.5       Auto Immature Granulocyte %: 0.6 % (03-27-25 @ 16:39)    03-27    141  |  107  |  20  ----------------------------<  112[H]  4.9   |  22  |  0.7      Calcium: 9.4 mg/dL (03-27-25 @ 16:39)      LFTs:             6.6  | <0.2 | 20       ------------------[77      ( 27 Mar 2025 16:39 )  3.9  | x    | 12          Urinalysis Basic - ( 27 Mar 2025 16:39 )    Color: x / Appearance: x / SG: x / pH: x  Gluc: 112 mg/dL / Ketone: x  / Bili: x / Urobili: x   Blood: x / Protein: x / Nitrite: x   Leuk Esterase: x / RBC: x / WBC x   Sq Epi: x / Non Sq Epi: x / Bacteria: x    IMAGING:  n/a    ACCESS/ DEVICES:  [x ] Peripheral IV

## 2025-03-30 LAB
-  CLINDAMYCIN: SIGNIFICANT CHANGE UP
-  DAPTOMYCIN: SIGNIFICANT CHANGE UP
-  ERYTHROMYCIN: SIGNIFICANT CHANGE UP
-  GENTAMICIN: SIGNIFICANT CHANGE UP
-  LINEZOLID: SIGNIFICANT CHANGE UP
-  OXACILLIN: SIGNIFICANT CHANGE UP
-  PENICILLIN: SIGNIFICANT CHANGE UP
-  RIFAMPIN: SIGNIFICANT CHANGE UP
-  TETRACYCLINE: SIGNIFICANT CHANGE UP
-  TRIMETHOPRIM/SULFAMETHOXAZOLE: SIGNIFICANT CHANGE UP
-  VANCOMYCIN: SIGNIFICANT CHANGE UP
METHOD TYPE: SIGNIFICANT CHANGE UP

## 2025-03-31 PROBLEM — F17.200 NICOTINE DEPENDENCE, UNSPECIFIED, UNCOMPLICATED: Chronic | Status: ACTIVE | Noted: 2025-03-27

## 2025-03-31 PROBLEM — Z00.00 ENCOUNTER FOR PREVENTIVE HEALTH EXAMINATION: Status: ACTIVE | Noted: 2025-03-31

## 2025-03-31 PROBLEM — F11.10 OPIOID ABUSE, UNCOMPLICATED: Chronic | Status: ACTIVE | Noted: 2025-03-27

## 2025-03-31 PROBLEM — F12.90 CANNABIS USE, UNSPECIFIED, UNCOMPLICATED: Chronic | Status: ACTIVE | Noted: 2025-03-27

## 2025-03-31 RX ORDER — SULFAMETHOXAZOLE/TRIMETHOPRIM 800-160 MG
1 TABLET ORAL
Qty: 14 | Refills: 0
Start: 2025-03-31 | End: 2025-04-06

## 2025-03-31 RX ORDER — OXYCODONE HYDROCHLORIDE AND ACETAMINOPHEN 10; 325 MG/1; MG/1
1 TABLET ORAL
Qty: 6 | Refills: 0
Start: 2025-03-31 | End: 2025-04-01

## 2025-03-31 NOTE — CHART NOTE - NSCHARTNOTEFT_GEN_A_CORE
Work Note    Please excuse Ross Mallory from work for 3/27 to 4/1. Can return to work on 4/2/25.
called back patient, he had received percocet 12 tabs before, but he is still in pain and he would like more  we prescribed 6 percocet, he also has been prescribed bactrim by ID,   he was encouraged to take motrin at the same time   follow up in clinic with dr padilla
Daily dressing changes for L gluteal abscess, R gluteal abscess, R knee abscess: pack with 1/4-inch dry packing, cover with gauze and tape

## 2025-04-01 DIAGNOSIS — Z72.0 TOBACCO USE: ICD-10-CM

## 2025-04-01 DIAGNOSIS — L02.31 CUTANEOUS ABSCESS OF BUTTOCK: ICD-10-CM

## 2025-04-01 DIAGNOSIS — D72.829 ELEVATED WHITE BLOOD CELL COUNT, UNSPECIFIED: ICD-10-CM

## 2025-04-01 DIAGNOSIS — F12.10 CANNABIS ABUSE, UNCOMPLICATED: ICD-10-CM

## 2025-04-01 DIAGNOSIS — L02.415 CUTANEOUS ABSCESS OF RIGHT LOWER LIMB: ICD-10-CM

## 2025-04-01 DIAGNOSIS — G89.18 OTHER ACUTE POSTPROCEDURAL PAIN: ICD-10-CM

## 2025-04-01 DIAGNOSIS — Z71.6 TOBACCO ABUSE COUNSELING: ICD-10-CM

## 2025-04-02 LAB
CULTURE RESULTS: ABNORMAL
ORGANISM # SPEC MICROSCOPIC CNT: ABNORMAL
ORGANISM # SPEC MICROSCOPIC CNT: SIGNIFICANT CHANGE UP
SPECIMEN SOURCE: SIGNIFICANT CHANGE UP

## 2025-04-03 ENCOUNTER — APPOINTMENT (OUTPATIENT)
Dept: SURGERY | Facility: CLINIC | Age: 60
End: 2025-04-03

## 2025-04-03 VITALS — DIASTOLIC BLOOD PRESSURE: 82 MMHG | SYSTOLIC BLOOD PRESSURE: 124 MMHG

## 2025-08-14 ENCOUNTER — EMERGENCY (EMERGENCY)
Facility: HOSPITAL | Age: 60
LOS: 0 days | Discharge: HOME | End: 2025-08-14
Attending: EMERGENCY MEDICINE
Payer: MEDICAID

## 2025-08-14 VITALS
SYSTOLIC BLOOD PRESSURE: 100 MMHG | RESPIRATION RATE: 16 BRPM | TEMPERATURE: 98 F | OXYGEN SATURATION: 98 % | HEART RATE: 65 BPM | DIASTOLIC BLOOD PRESSURE: 60 MMHG

## 2025-08-14 VITALS — HEIGHT: 73 IN | WEIGHT: 154.98 LBS

## 2025-08-14 DIAGNOSIS — L03.116 CELLULITIS OF LEFT LOWER LIMB: ICD-10-CM

## 2025-08-14 DIAGNOSIS — M79.89 OTHER SPECIFIED SOFT TISSUE DISORDERS: ICD-10-CM

## 2025-08-14 DIAGNOSIS — Z23 ENCOUNTER FOR IMMUNIZATION: ICD-10-CM

## 2025-08-14 PROBLEM — F17.200 NICOTINE DEPENDENCE, UNSPECIFIED, UNCOMPLICATED: Chronic | Status: ACTIVE | Noted: 2025-03-27

## 2025-08-14 PROBLEM — F12.90 CANNABIS USE, UNSPECIFIED, UNCOMPLICATED: Chronic | Status: ACTIVE | Noted: 2025-03-27

## 2025-08-14 LAB
ALBUMIN SERPL ELPH-MCNC: 4.3 G/DL — SIGNIFICANT CHANGE UP (ref 3.5–5.2)
ALP SERPL-CCNC: 75 U/L — SIGNIFICANT CHANGE UP (ref 30–115)
ALT FLD-CCNC: 14 U/L — SIGNIFICANT CHANGE UP (ref 0–41)
ANION GAP SERPL CALC-SCNC: 10 MMOL/L — SIGNIFICANT CHANGE UP (ref 7–14)
AST SERPL-CCNC: 17 U/L — SIGNIFICANT CHANGE UP (ref 0–41)
BASOPHILS # BLD AUTO: 0.04 K/UL — SIGNIFICANT CHANGE UP (ref 0–0.2)
BASOPHILS NFR BLD AUTO: 0.6 % — SIGNIFICANT CHANGE UP (ref 0–1)
BILIRUB SERPL-MCNC: <0.2 MG/DL — SIGNIFICANT CHANGE UP (ref 0.2–1.2)
BUN SERPL-MCNC: 27 MG/DL — HIGH (ref 10–20)
CALCIUM SERPL-MCNC: 9.6 MG/DL — SIGNIFICANT CHANGE UP (ref 8.4–10.5)
CHLORIDE SERPL-SCNC: 106 MMOL/L — SIGNIFICANT CHANGE UP (ref 98–110)
CO2 SERPL-SCNC: 25 MMOL/L — SIGNIFICANT CHANGE UP (ref 17–32)
CREAT SERPL-MCNC: 0.7 MG/DL — SIGNIFICANT CHANGE UP (ref 0.7–1.5)
CRP SERPL-MCNC: 6.2 MG/L — HIGH
EGFR: 104 ML/MIN/1.73M2 — SIGNIFICANT CHANGE UP
EGFR: 104 ML/MIN/1.73M2 — SIGNIFICANT CHANGE UP
EOSINOPHIL # BLD AUTO: 0.23 K/UL — SIGNIFICANT CHANGE UP (ref 0–0.7)
EOSINOPHIL NFR BLD AUTO: 3.2 % — SIGNIFICANT CHANGE UP (ref 0–8)
ERYTHROCYTE [SEDIMENTATION RATE] IN BLOOD: 47 MM/HR — HIGH (ref 0–15)
GLUCOSE SERPL-MCNC: 87 MG/DL — SIGNIFICANT CHANGE UP (ref 70–99)
HCT VFR BLD CALC: 38.2 % — LOW (ref 42–52)
HGB BLD-MCNC: 12.8 G/DL — LOW (ref 14–18)
IMM GRANULOCYTES NFR BLD AUTO: 0.3 % — SIGNIFICANT CHANGE UP (ref 0.1–0.3)
LYMPHOCYTES # BLD AUTO: 1.88 K/UL — SIGNIFICANT CHANGE UP (ref 1.2–3.4)
LYMPHOCYTES # BLD AUTO: 26.2 % — SIGNIFICANT CHANGE UP (ref 20.5–51.1)
MCHC RBC-ENTMCNC: 31.5 PG — HIGH (ref 27–31)
MCHC RBC-ENTMCNC: 33.5 G/DL — SIGNIFICANT CHANGE UP (ref 32–37)
MCV RBC AUTO: 94.1 FL — HIGH (ref 80–94)
MONOCYTES # BLD AUTO: 0.65 K/UL — HIGH (ref 0.1–0.6)
MONOCYTES NFR BLD AUTO: 9.1 % — SIGNIFICANT CHANGE UP (ref 1.7–9.3)
NEUTROPHILS # BLD AUTO: 4.35 K/UL — SIGNIFICANT CHANGE UP (ref 1.4–6.5)
NEUTROPHILS NFR BLD AUTO: 60.6 % — SIGNIFICANT CHANGE UP (ref 42.2–75.2)
NRBC BLD AUTO-RTO: 0 /100 WBCS — SIGNIFICANT CHANGE UP (ref 0–0)
PLATELET # BLD AUTO: 232 K/UL — SIGNIFICANT CHANGE UP (ref 130–400)
PMV BLD: 9.8 FL — SIGNIFICANT CHANGE UP (ref 7.4–10.4)
POTASSIUM SERPL-MCNC: 4.5 MMOL/L — SIGNIFICANT CHANGE UP (ref 3.5–5)
POTASSIUM SERPL-SCNC: 4.5 MMOL/L — SIGNIFICANT CHANGE UP (ref 3.5–5)
PROT SERPL-MCNC: 6.8 G/DL — SIGNIFICANT CHANGE UP (ref 6–8)
RBC # BLD: 4.06 M/UL — LOW (ref 4.7–6.1)
RBC # FLD: 12.5 % — SIGNIFICANT CHANGE UP (ref 11.5–14.5)
SODIUM SERPL-SCNC: 141 MMOL/L — SIGNIFICANT CHANGE UP (ref 135–146)
WBC # BLD: 7.17 K/UL — SIGNIFICANT CHANGE UP (ref 4.8–10.8)
WBC # FLD AUTO: 7.17 K/UL — SIGNIFICANT CHANGE UP (ref 4.8–10.8)

## 2025-08-14 PROCEDURE — 99285 EMERGENCY DEPT VISIT HI MDM: CPT

## 2025-08-14 PROCEDURE — 90715 TDAP VACCINE 7 YRS/> IM: CPT

## 2025-08-14 PROCEDURE — 80053 COMPREHEN METABOLIC PANEL: CPT

## 2025-08-14 PROCEDURE — 73630 X-RAY EXAM OF FOOT: CPT | Mod: LT

## 2025-08-14 PROCEDURE — 90471 IMMUNIZATION ADMIN: CPT

## 2025-08-14 PROCEDURE — 85652 RBC SED RATE AUTOMATED: CPT

## 2025-08-14 PROCEDURE — 99283 EMERGENCY DEPT VISIT LOW MDM: CPT

## 2025-08-14 PROCEDURE — 86140 C-REACTIVE PROTEIN: CPT

## 2025-08-14 PROCEDURE — 36415 COLL VENOUS BLD VENIPUNCTURE: CPT

## 2025-08-14 PROCEDURE — 99284 EMERGENCY DEPT VISIT MOD MDM: CPT | Mod: 25

## 2025-08-14 PROCEDURE — 85025 COMPLETE CBC W/AUTO DIFF WBC: CPT

## 2025-08-14 PROCEDURE — 73630 X-RAY EXAM OF FOOT: CPT | Mod: 26,LT

## 2025-08-14 RX ORDER — AMOXICILLIN AND CLAVULANATE POTASSIUM 500; 125 MG/1; MG/1
1 TABLET, FILM COATED ORAL
Qty: 14 | Refills: 0
Start: 2025-08-14 | End: 2025-08-20

## 2025-08-14 RX ORDER — IBUPROFEN 200 MG
600 TABLET ORAL ONCE
Refills: 0 | Status: COMPLETED | OUTPATIENT
Start: 2025-08-14 | End: 2025-08-14

## 2025-08-14 RX ORDER — ACETAMINOPHEN 500 MG/5ML
975 LIQUID (ML) ORAL ONCE
Refills: 0 | Status: COMPLETED | OUTPATIENT
Start: 2025-08-14 | End: 2025-08-14

## 2025-08-14 RX ORDER — AMOXICILLIN AND CLAVULANATE POTASSIUM 500; 125 MG/1; MG/1
14 TABLET, FILM COATED ORAL
Qty: 14 | Refills: 0
Start: 2025-08-14 | End: 2025-08-20

## 2025-08-14 RX ADMIN — Medication 975 MILLIGRAM(S): at 13:58

## 2025-08-14 RX ADMIN — Medication 600 MILLIGRAM(S): at 13:58
